# Patient Record
Sex: FEMALE | Race: WHITE | HISPANIC OR LATINO | URBAN - METROPOLITAN AREA
[De-identification: names, ages, dates, MRNs, and addresses within clinical notes are randomized per-mention and may not be internally consistent; named-entity substitution may affect disease eponyms.]

---

## 2017-02-14 ENCOUNTER — APPOINTMENT (RX ONLY)
Dept: URBAN - METROPOLITAN AREA CLINIC 127 | Facility: CLINIC | Age: 42
Setting detail: DERMATOLOGY
End: 2017-02-14

## 2017-02-14 DIAGNOSIS — L29.89 OTHER PRURITUS: ICD-10-CM

## 2017-02-14 PROBLEM — J45.909 UNSPECIFIED ASTHMA, UNCOMPLICATED: Status: ACTIVE | Noted: 2017-02-14

## 2017-02-14 PROBLEM — L29.8 OTHER PRURITUS: Status: ACTIVE | Noted: 2017-02-14

## 2017-02-14 PROBLEM — E13.9 OTHER SPECIFIED DIABETES MELLITUS WITHOUT COMPLICATIONS: Status: ACTIVE | Noted: 2017-02-14

## 2017-02-14 PROBLEM — E78.5 HYPERLIPIDEMIA, UNSPECIFIED: Status: ACTIVE | Noted: 2017-02-14

## 2017-02-14 PROCEDURE — ? TREATMENT REGIMEN

## 2017-02-14 PROCEDURE — 99202 OFFICE O/P NEW SF 15 MIN: CPT

## 2017-02-14 NOTE — PROCEDURE: TREATMENT REGIMEN
Discontinue Regimen: Stop all current treatments, soap, alcohol, vinegar
Initiate Treatment: Use vaniply products: soap, facial cleanser, moisturizer and wash clothes and linen in all free detergen. Kevonna for itching. (samples of all given).
Detail Level: Zone
Plan: f/u 2 weeks.

## 2017-02-14 NOTE — HPI: RASH
How Severe Is Your Rash?: mild
Is This A New Presentation, Or A Follow-Up?: Rash
Additional History: Patient states she has sensitive skin. Patient uses Dove soap to wash her face. She has tried zyrtec and OTC cortisone without any improvement. Itching is worse at night and in am.Does not have any animals. \\nShe had before and went to Urgent Care and was given pills ? that cleared it up.

## 2017-02-28 ENCOUNTER — APPOINTMENT (RX ONLY)
Dept: URBAN - METROPOLITAN AREA CLINIC 127 | Facility: CLINIC | Age: 42
Setting detail: DERMATOLOGY
End: 2017-02-28

## 2017-02-28 DIAGNOSIS — L29.89 OTHER PRURITUS: ICD-10-CM

## 2017-02-28 PROBLEM — L29.8 OTHER PRURITUS: Status: ACTIVE | Noted: 2017-02-28

## 2017-02-28 PROCEDURE — ? COUNSELING

## 2017-02-28 PROCEDURE — ? ORDER TESTS

## 2017-02-28 PROCEDURE — 99212 OFFICE O/P EST SF 10 MIN: CPT

## 2017-02-28 PROCEDURE — ? PRESCRIPTION

## 2017-02-28 RX ORDER — IODOQUINOL, HYDROCORTISONE ACETATE AND ALOE VERA LEAF 10; 20; 10 MG/G; MG/G; MG/G
GEL TOPICAL
Qty: 1 | Refills: 1 | Status: ERX | COMMUNITY
Start: 2017-02-28

## 2017-02-28 RX ADMIN — IODOQUINOL, HYDROCORTISONE ACETATE AND ALOE VERA LEAF: 10; 20; 10 GEL TOPICAL at 15:14

## 2017-02-28 ASSESSMENT — LOCATION DETAILED DESCRIPTION DERM
LOCATION DETAILED: RIGHT SUPERIOR UPPER BACK
LOCATION DETAILED: RIGHT INFERIOR CENTRAL MALAR CHEEK
LOCATION DETAILED: LEFT SUPERIOR UPPER BACK
LOCATION DETAILED: LEFT CLAVICULAR SKIN
LOCATION DETAILED: LEFT INFERIOR CENTRAL MALAR CHEEK
LOCATION DETAILED: RIGHT LATERAL SUPERIOR CHEST

## 2017-02-28 ASSESSMENT — LOCATION SIMPLE DESCRIPTION DERM
LOCATION SIMPLE: LEFT CHEEK
LOCATION SIMPLE: RIGHT UPPER BACK
LOCATION SIMPLE: LEFT UPPER BACK
LOCATION SIMPLE: CHEST
LOCATION SIMPLE: RIGHT CHEEK
LOCATION SIMPLE: LEFT CLAVICULAR SKIN

## 2017-02-28 ASSESSMENT — LOCATION ZONE DERM
LOCATION ZONE: FACE
LOCATION ZONE: TRUNK

## 2017-02-28 NOTE — PROCEDURE: ORDER TESTS
Expected Date Of Service: 02/28/2017
Performing Laboratory: -[id number]
Bill For Surgical Tray: no
Billing Type: [employer]